# Patient Record
Sex: FEMALE | Race: NATIVE HAWAIIAN OR OTHER PACIFIC ISLANDER | NOT HISPANIC OR LATINO | URBAN - METROPOLITAN AREA
[De-identification: names, ages, dates, MRNs, and addresses within clinical notes are randomized per-mention and may not be internally consistent; named-entity substitution may affect disease eponyms.]

---

## 2024-02-14 ENCOUNTER — INPATIENT (INPATIENT)
Facility: HOSPITAL | Age: 29
LOS: 1 days | Discharge: HOME | DRG: 603 | End: 2024-02-16
Attending: STUDENT IN AN ORGANIZED HEALTH CARE EDUCATION/TRAINING PROGRAM | Admitting: STUDENT IN AN ORGANIZED HEALTH CARE EDUCATION/TRAINING PROGRAM
Payer: COMMERCIAL

## 2024-02-14 VITALS
SYSTOLIC BLOOD PRESSURE: 116 MMHG | TEMPERATURE: 98 F | HEIGHT: 64 IN | WEIGHT: 130.07 LBS | RESPIRATION RATE: 16 BRPM | HEART RATE: 74 BPM | OXYGEN SATURATION: 98 % | DIASTOLIC BLOOD PRESSURE: 75 MMHG

## 2024-02-14 DIAGNOSIS — L03.90 CELLULITIS, UNSPECIFIED: ICD-10-CM

## 2024-02-14 DIAGNOSIS — R63.8 OTHER SYMPTOMS AND SIGNS CONCERNING FOOD AND FLUID INTAKE: ICD-10-CM

## 2024-02-14 LAB
ALBUMIN SERPL ELPH-MCNC: 4 G/DL — SIGNIFICANT CHANGE UP (ref 3.3–5)
ALP SERPL-CCNC: 42 U/L — SIGNIFICANT CHANGE UP (ref 40–120)
ALT FLD-CCNC: 12 U/L — SIGNIFICANT CHANGE UP (ref 10–45)
ANION GAP SERPL CALC-SCNC: 9 MMOL/L — SIGNIFICANT CHANGE UP (ref 5–17)
APPEARANCE UR: CLEAR — SIGNIFICANT CHANGE UP
APTT BLD: 28.7 SEC — SIGNIFICANT CHANGE UP (ref 24.5–35.6)
AST SERPL-CCNC: 16 U/L — SIGNIFICANT CHANGE UP (ref 10–40)
BACTERIA # UR AUTO: ABNORMAL /HPF
BASOPHILS # BLD AUTO: 0.06 K/UL — SIGNIFICANT CHANGE UP (ref 0–0.2)
BASOPHILS NFR BLD AUTO: 0.7 % — SIGNIFICANT CHANGE UP (ref 0–2)
BILIRUB SERPL-MCNC: 0.4 MG/DL — SIGNIFICANT CHANGE UP (ref 0.2–1.2)
BILIRUB UR-MCNC: NEGATIVE — SIGNIFICANT CHANGE UP
BUN SERPL-MCNC: 12 MG/DL — SIGNIFICANT CHANGE UP (ref 7–23)
CALCIUM SERPL-MCNC: 9.1 MG/DL — SIGNIFICANT CHANGE UP (ref 8.4–10.5)
CHLORIDE SERPL-SCNC: 101 MMOL/L — SIGNIFICANT CHANGE UP (ref 96–108)
CK MB CFR SERPL CALC: 1.9 NG/ML — SIGNIFICANT CHANGE UP (ref 0–6.7)
CK SERPL-CCNC: 98 U/L — SIGNIFICANT CHANGE UP (ref 25–170)
CO2 SERPL-SCNC: 24 MMOL/L — SIGNIFICANT CHANGE UP (ref 22–31)
COLOR SPEC: YELLOW — SIGNIFICANT CHANGE UP
CREAT SERPL-MCNC: 0.99 MG/DL — SIGNIFICANT CHANGE UP (ref 0.5–1.3)
DIFF PNL FLD: NEGATIVE — SIGNIFICANT CHANGE UP
EGFR: 80 ML/MIN/1.73M2 — SIGNIFICANT CHANGE UP
EOSINOPHIL # BLD AUTO: 0.47 K/UL — SIGNIFICANT CHANGE UP (ref 0–0.5)
EOSINOPHIL NFR BLD AUTO: 5.3 % — SIGNIFICANT CHANGE UP (ref 0–6)
GLUCOSE SERPL-MCNC: 111 MG/DL — HIGH (ref 70–99)
GLUCOSE UR QL: NEGATIVE MG/DL — SIGNIFICANT CHANGE UP
HCG SERPL-ACNC: <0 MIU/ML — SIGNIFICANT CHANGE UP
HCT VFR BLD CALC: 45.8 % — HIGH (ref 34.5–45)
HGB BLD-MCNC: 15.6 G/DL — HIGH (ref 11.5–15.5)
IMM GRANULOCYTES NFR BLD AUTO: 0.1 % — SIGNIFICANT CHANGE UP (ref 0–0.9)
INR BLD: 1 — SIGNIFICANT CHANGE UP (ref 0.85–1.18)
KETONES UR-MCNC: NEGATIVE MG/DL — SIGNIFICANT CHANGE UP
LACTATE SERPL-SCNC: 0.8 MMOL/L — SIGNIFICANT CHANGE UP (ref 0.5–2)
LEUKOCYTE ESTERASE UR-ACNC: ABNORMAL
LYMPHOCYTES # BLD AUTO: 2.01 K/UL — SIGNIFICANT CHANGE UP (ref 1–3.3)
LYMPHOCYTES # BLD AUTO: 22.5 % — SIGNIFICANT CHANGE UP (ref 13–44)
MCHC RBC-ENTMCNC: 31.8 PG — SIGNIFICANT CHANGE UP (ref 27–34)
MCHC RBC-ENTMCNC: 34.1 GM/DL — SIGNIFICANT CHANGE UP (ref 32–36)
MCV RBC AUTO: 93.3 FL — SIGNIFICANT CHANGE UP (ref 80–100)
MONOCYTES # BLD AUTO: 0.77 K/UL — SIGNIFICANT CHANGE UP (ref 0–0.9)
MONOCYTES NFR BLD AUTO: 8.6 % — SIGNIFICANT CHANGE UP (ref 2–14)
NEUTROPHILS # BLD AUTO: 5.63 K/UL — SIGNIFICANT CHANGE UP (ref 1.8–7.4)
NEUTROPHILS NFR BLD AUTO: 62.8 % — SIGNIFICANT CHANGE UP (ref 43–77)
NITRITE UR-MCNC: NEGATIVE — SIGNIFICANT CHANGE UP
NRBC # BLD: 0 /100 WBCS — SIGNIFICANT CHANGE UP (ref 0–0)
PH UR: 6.5 — SIGNIFICANT CHANGE UP (ref 5–8)
PLATELET # BLD AUTO: 251 K/UL — SIGNIFICANT CHANGE UP (ref 150–400)
POTASSIUM SERPL-MCNC: 4.4 MMOL/L — SIGNIFICANT CHANGE UP (ref 3.5–5.3)
POTASSIUM SERPL-SCNC: 4.4 MMOL/L — SIGNIFICANT CHANGE UP (ref 3.5–5.3)
PROT SERPL-MCNC: 6.2 G/DL — SIGNIFICANT CHANGE UP (ref 6–8.3)
PROT UR-MCNC: NEGATIVE MG/DL — SIGNIFICANT CHANGE UP
PROTHROM AB SERPL-ACNC: 11.4 SEC — SIGNIFICANT CHANGE UP (ref 9.5–13)
RBC # BLD: 4.91 M/UL — SIGNIFICANT CHANGE UP (ref 3.8–5.2)
RBC # FLD: 12.5 % — SIGNIFICANT CHANGE UP (ref 10.3–14.5)
RBC CASTS # UR COMP ASSIST: 2 /HPF — SIGNIFICANT CHANGE UP (ref 0–4)
SODIUM SERPL-SCNC: 134 MMOL/L — LOW (ref 135–145)
SP GR SPEC: 1.02 — SIGNIFICANT CHANGE UP (ref 1–1.03)
SQUAMOUS # UR AUTO: 1 /HPF — SIGNIFICANT CHANGE UP (ref 0–5)
UROBILINOGEN FLD QL: 1 MG/DL — SIGNIFICANT CHANGE UP (ref 0.2–1)
WBC # BLD: 8.95 K/UL — SIGNIFICANT CHANGE UP (ref 3.8–10.5)
WBC # FLD AUTO: 8.95 K/UL — SIGNIFICANT CHANGE UP (ref 3.8–10.5)
WBC UR QL: 1 /HPF — SIGNIFICANT CHANGE UP (ref 0–5)

## 2024-02-14 PROCEDURE — 99223 1ST HOSP IP/OBS HIGH 75: CPT | Mod: GC

## 2024-02-14 PROCEDURE — 99285 EMERGENCY DEPT VISIT HI MDM: CPT

## 2024-02-14 RX ORDER — PIPERACILLIN AND TAZOBACTAM 4; .5 G/20ML; G/20ML
3.38 INJECTION, POWDER, LYOPHILIZED, FOR SOLUTION INTRAVENOUS ONCE
Refills: 0 | Status: COMPLETED | OUTPATIENT
Start: 2024-02-14 | End: 2024-02-14

## 2024-02-14 RX ORDER — VANCOMYCIN HCL 1 G
1000 VIAL (EA) INTRAVENOUS ONCE
Refills: 0 | Status: COMPLETED | OUTPATIENT
Start: 2024-02-14 | End: 2024-02-14

## 2024-02-14 RX ORDER — SODIUM CHLORIDE 9 MG/ML
1000 INJECTION INTRAMUSCULAR; INTRAVENOUS; SUBCUTANEOUS ONCE
Refills: 0 | Status: COMPLETED | OUTPATIENT
Start: 2024-02-14 | End: 2024-02-14

## 2024-02-14 RX ADMIN — Medication 250 MILLIGRAM(S): at 21:48

## 2024-02-14 RX ADMIN — PIPERACILLIN AND TAZOBACTAM 200 GRAM(S): 4; .5 INJECTION, POWDER, LYOPHILIZED, FOR SOLUTION INTRAVENOUS at 21:48

## 2024-02-14 RX ADMIN — SODIUM CHLORIDE 1000 MILLILITER(S): 9 INJECTION INTRAMUSCULAR; INTRAVENOUS; SUBCUTANEOUS at 21:48

## 2024-02-14 NOTE — ED PROVIDER NOTE - CLINICAL SUMMARY MEDICAL DECISION MAKING FREE TEXT BOX
28F PMH SIBO, gluten intolerance, p/w redness/swelling. 8d ago while in [place], after a yoga class, pt began feeling itching to LLQ/L back and L thigh - she noticed 3 small red/bumpy areas at those regions. Began applying hydrocortisone cream but symptoms were worsening. Had a virtual visit w/ her PMD and was prescribed ketoconazole. Began having oozing from the skin and symptoms continued to worsen and went to  when she came to [place] and was prescribed mupirocin and bactrim 2d ago. Symptoms (of redness/swelling and mild discomfort) continue to worsen so came to ED. No other systemic symptoms.   Vitals wnl, exam as above. Very well appearing.   ddx: What may have started out as a contact dermatitis or mild fungal infection is now almost certainly a superimposed bacterial cellulitis. Clinically not nec fasc.   Labs, empiric abx, reassess.

## 2024-02-14 NOTE — H&P ADULT - PROBLEM SELECTOR PLAN 1
Symptoms began >1 week prior s/p yoga class. Was using ketoconazole lotion then transitioned to PO bactrim and mupirocin s/p UC evaluation i/s/o skin oozing. Physical exam showing     - c/w Cefazolin 1g IV q8hr   - continue to monitor progression of symptoms Symptoms began >1 week prior s/p yoga class. Was using ketoconazole lotion then transitioned to PO bactrim and mupirocin s/p UC evaluation i/s/o skin oozing. Physical exam showing extensive   - c/w Cefazolin 1g IV q8hr   - continue to monitor progression of symptoms  - f/u ESR/CRP in AM  - no imaging indicated at this time -- if cellulitis worsening, can consider broad spectrum with imaging. NO leukocytosis and no signs of sepsis

## 2024-02-14 NOTE — H&P ADULT - PROBLEM SELECTOR PLAN 2
F: 1L NS   E: replete as needed  D: Gluten free    Dispo: [facility]

## 2024-02-14 NOTE — ED ADULT NURSE NOTE - CHIEF COMPLAINT QUOTE
Pt presents with c/o dx "cellulitic infection", with onset to abdomen x one week ago, seen at [facility] MD, given antifungal cream, s/s worsened, spreading to back and left leg, seen at  x 2 days ago, started on oral ABX and topical ABX, wants to be re-evaluated.

## 2024-02-14 NOTE — ED ADULT NURSE NOTE - OBJECTIVE STATEMENT
Patient w/ maculo- papular and erythematous rashes and hives on abdomen, back, buttocks and bilateral thighs, painful and pruritic w/ some serous discharge, no fever, no chills.  Patient states this started 1 week ago after a yoga class while in [place], becoming worse.  No SOB , no mouth/throat swelling.  Went to an UC 2 days ago, Dx w/ possible Staph infection and cellulitis, prescribed Sulfa antibiotic and Muciprocin, states rash and symptoms becoming worse.

## 2024-02-14 NOTE — H&P ADULT - NSHPPHYSICALEXAM_GEN_ALL_CORE
Detail Level: Detailed Quality 110: Preventive Care And Screening: Influenza Immunization: Influenza Immunization previously received during influenza season Quality 130: Documentation Of Current Medications In The Medical Record: Current Medications Documented Quality 431: Preventive Care And Screening: Unhealthy Alcohol Use - Screening: Patient not identified as an unhealthy alcohol user when screened for unhealthy alcohol use using a systematic screening method Quality 226: Preventive Care And Screening: Tobacco Use: Screening And Cessation Intervention: Patient screened for tobacco use and is an ex/non-smoker VITAL SIGNS:  T(F): 97.9 (02-15-24 @ 02:10), Max: 98.5 (02-14-24 @ 20:42)  HR: 65 (02-15-24 @ 02:10) (65 - 74)  BP: 117/69 (02-15-24 @ 02:10) (110/71 - 117/69)  RR: 17 (02-15-24 @ 02:10) (16 - 17)  SpO2: 100% (02-15-24 @ 02:10) (98% - 100%)    PHYSICAL EXAM:    Constitutional: resting comfortably in bed; NAD  Head: NC/AT  Eyes: PERRL, EOMI, clear conjunctiva  ENT: no nasal discharge; MMM  Respiratory: CTA B/L; no W/R/R  Cardiac: +S1/S2; RRR; no M/R/G  Gastrointestinal: soft, NT/ND; no rebound or guarding; +BSx4  Extremities: WWP; RLE with edema and redness extending from left lower back to posterior knee with mild serosanguinous drainage noted   Musculoskeletal: NROM x4; no joint swelling, tenderness or erythema  Vascular: 2+ radial, femoral, DP/PT pulses B/L  Neurologic: AAOx3;no focal deficits  Psychiatric: affect and characteristics of appearance, verbalizations, behaviors are appropriate

## 2024-02-14 NOTE — H&P ADULT - ASSESSMENT
27 yo female with hx of SIBO presented to ED i/s/o worsening L back and thigh skin ulcerations despite being on PO abx. Admitted for management of cellulitis.

## 2024-02-14 NOTE — ED PROVIDER NOTE - PHYSICAL EXAMINATION
Extensive blanching erythema/warmth to LLQ/L flank and L anterior/lateral/posterior thigh. Erythema extends beyond demarcated line drawn by UC. There are scattered areas of whitich skin at the edges of the erythema. Also areas of mild skin thickness w/ scant yellowish discharge - minimally on LLQ but larger area on L thigh.   Soft compartments. FROM all joints.   no other LE edema. normal equal distal pulses, steady unassisted gait.  No crepitus, induration, fluctuance.

## 2024-02-14 NOTE — H&P ADULT - ATTENDING COMMENTS
#Cellultiis: p/w extensive LLE cellulitis extending from L lower back to L knee. +L inguinal lymphadenopathy. +warmth, edema, weeping on exam, no fluctance or crepitis. Compartments intact. 0/4 SIRS criteria. F/up ESR/CRP. c/w Cefazolin for non purulent cellulitis. Serial exams- Consider CT imaging if worsening.

## 2024-02-14 NOTE — ED ADULT NURSE NOTE - NSFALLUNIVINTERV_ED_ALL_ED
Bed/Stretcher in lowest position, wheels locked, appropriate side rails in place/Call bell, personal items and telephone in reach/Instruct patient to call for assistance before getting out of bed/chair/stretcher/Non-slip footwear applied when patient is off stretcher/[place] to call system/Physically safe environment - no spills, clutter or unnecessary equipment/Purposeful proactive rounding/Room/bathroom lighting operational, light cord in reach

## 2024-02-14 NOTE — ED ADULT NURSE REASSESSMENT NOTE - NS ED NURSE REASSESS COMMENT FT1
Pt handed off to [facility] nurse Martinez. Pt w/ DIRTY bed. Pt resting comfortably in chair, bedside report given.

## 2024-02-14 NOTE — ED ADULT TRIAGE NOTE - CHIEF COMPLAINT QUOTE
Pt presents with c/o dx "cellulitic infection", with onset to abdomen x one week ago, seen at [facility] MD, given antifungal cream, s/s worsened, spreading to back and left leg, seen at  x 2 days ago, started on oral ABX and topical ABX, wants to be re-evaluated.

## 2024-02-14 NOTE — ED PROVIDER NOTE - OBJECTIVE STATEMENT
28F PMH SIBO, gluten intolerance, p/w redness/swelling. 8d ago while in [place], after a yoga class, pt began feeling itching to LLQ/L back and L thigh - she noticed 3 small red/bumpy areas at those regions. Began applying hydrocortisone cream but symptoms were worsening. Had a virtual visit w/ her PMD and was prescribed ketoconazole. Began having oozing from the skin and symptoms continued to worsen and went to UC when she came to [place] and was prescribed mupirocin and bactrim 2d ago. Symptoms (of redness/swelling and mild discomfort) continue to worsen so came to ED. No other systemic symptoms.   Denies f/c, SOB/CP, URI symptoms, NVD, abd pain, urinary complaints, focal weakness/numbness, other known environmental exposures.

## 2024-02-14 NOTE — H&P ADULT - NSHPLABSRESULTS_GEN_ALL_CORE
.  LABS:                         15.6   8.95  )-----------( 251      ( 14 Feb 2024 21:55 )             45.8     02-14    134<L>  |  101  |  12  ----------------------------<  111<H>  4.4   |  24  |  0.99    Ca    9.1      14 Feb 2024 21:55    TPro  6.2  /  Alb  4.0  /  TBili  0.4  /  DBili  x   /  AST  16  /  ALT  12  /  AlkPhos  42  02-14    PT/INR - ( 14 Feb 2024 21:55 )   PT: 11.4 sec;   INR: 1.00          PTT - ( 14 Feb 2024 21:55 )  PTT:28.7 sec  Urinalysis Basic - ( 14 Feb 2024 21:55 )    Color: x / Appearance: x / SG: x / pH: x  Gluc: 111 mg/dL / Ketone: x  / Bili: x / Urobili: x   Blood: x / Protein: x / Nitrite: x   Leuk Esterase: x / RBC: x / WBC x   Sq Epi: x / Non Sq Epi: x / Bacteria: x        Lactate, Blood: 0.8 mmol/L (02-14 @ 21:55)      RADIOLOGY, EKG & ADDITIONAL TESTS: Reviewed.

## 2024-02-14 NOTE — H&P ADULT - HISTORY OF PRESENT ILLNESS
27 yo female with PMHx of SIBO, gluten intolerance presents to ED i/s/o redness and swelling to left thigh and back. Pt endorsing that her symptoms began >1 week prior in [place] after a yoga class and she was seen by PMD and has been using ketoconazole. Pt went to urgent care 2 days prior when she noticed oozing from the site and was prescribed PO Bactrim and Mupirocin. Pt presenting to ED today i/s/o worsening redness and swelling    Vitals: /75, HR 74, T 98.5 Oral, SpO2 98% on RA, RR 16  Labs: WBC 8.95, Hgb 15.6, Plt 251, Na 134, K 4.4, BUN 12, Cr 0.99,  HCG and Lactate negative; UA negative   Interventions: Vancomycin 1000mg IV x1, Zosyn 3.375mg IV x1, NS 1L x1  27 yo female with PMHx of SIBO, gluten intolerance presents to ED i/s/o redness and swelling to left thigh and back. Pt endorsing that her symptoms began >1 week prior in [place] after a yoga class and she was seen by PMD and has been using ketoconazole. Pt went to urgent care 2 days prior when she noticed oozing from the site and was prescribed PO Bactrim and Mupirocin. Pt presenting to ED today i/s/o worsening redness and swelling of her LLE. Pt endorsing that she otherwise feels fine and denies fever, chills, abdominal pain, nausea/vomiting.     ED Course:  Vitals: /75, HR 74, T 98.5 Oral, SpO2 98% on RA, RR 16  Labs: WBC 8.95, Hgb 15.6, Plt 251, Na 134, K 4.4, BUN 12, Cr 0.99,  HCG and Lactate negative; UA negative   Interventions: Vancomycin 1000mg IV x1, Zosyn 3.375mg IV x1, NS 1L x1

## 2024-02-14 NOTE — ED PROVIDER NOTE - PROGRESS NOTE DETAILS
[place]: labs grossly wnl. Pt remains well appearing. Given worsening symptoms, failing outpt abx, and exam findings, will admit pt for further care.

## 2024-02-15 LAB
ANION GAP SERPL CALC-SCNC: 5 MMOL/L — SIGNIFICANT CHANGE UP (ref 5–17)
BUN SERPL-MCNC: 12 MG/DL — SIGNIFICANT CHANGE UP (ref 7–23)
CALCIUM SERPL-MCNC: 8.1 MG/DL — LOW (ref 8.4–10.5)
CHLORIDE SERPL-SCNC: 112 MMOL/L — HIGH (ref 96–108)
CO2 SERPL-SCNC: 22 MMOL/L — SIGNIFICANT CHANGE UP (ref 22–31)
CREAT SERPL-MCNC: 1.02 MG/DL — SIGNIFICANT CHANGE UP (ref 0.5–1.3)
CRP SERPL-MCNC: <3 MG/L — SIGNIFICANT CHANGE UP (ref 0–4)
EGFR: 77 ML/MIN/1.73M2 — SIGNIFICANT CHANGE UP
ERYTHROCYTE [SEDIMENTATION RATE] IN BLOOD: 2 MM/HR — SIGNIFICANT CHANGE UP
GLUCOSE SERPL-MCNC: 88 MG/DL — SIGNIFICANT CHANGE UP (ref 70–99)
HCT VFR BLD CALC: 42.7 % — SIGNIFICANT CHANGE UP (ref 34.5–45)
HGB BLD-MCNC: 14.3 G/DL — SIGNIFICANT CHANGE UP (ref 11.5–15.5)
HIV 1+2 AB+HIV1 P24 AG SERPL QL IA: SIGNIFICANT CHANGE UP
MAGNESIUM SERPL-MCNC: 2 MG/DL — SIGNIFICANT CHANGE UP (ref 1.6–2.6)
MCHC RBC-ENTMCNC: 31.6 PG — SIGNIFICANT CHANGE UP (ref 27–34)
MCHC RBC-ENTMCNC: 33.5 GM/DL — SIGNIFICANT CHANGE UP (ref 32–36)
MCV RBC AUTO: 94.3 FL — SIGNIFICANT CHANGE UP (ref 80–100)
NRBC # BLD: 0 /100 WBCS — SIGNIFICANT CHANGE UP (ref 0–0)
PHOSPHATE SERPL-MCNC: 3.7 MG/DL — SIGNIFICANT CHANGE UP (ref 2.5–4.5)
PLATELET # BLD AUTO: 236 K/UL — SIGNIFICANT CHANGE UP (ref 150–400)
POTASSIUM SERPL-MCNC: 4.7 MMOL/L — SIGNIFICANT CHANGE UP (ref 3.5–5.3)
POTASSIUM SERPL-SCNC: 4.7 MMOL/L — SIGNIFICANT CHANGE UP (ref 3.5–5.3)
RBC # BLD: 4.53 M/UL — SIGNIFICANT CHANGE UP (ref 3.8–5.2)
RBC # FLD: 12.6 % — SIGNIFICANT CHANGE UP (ref 10.3–14.5)
SODIUM SERPL-SCNC: 139 MMOL/L — SIGNIFICANT CHANGE UP (ref 135–145)
WBC # BLD: 7.09 K/UL — SIGNIFICANT CHANGE UP (ref 3.8–10.5)
WBC # FLD AUTO: 7.09 K/UL — SIGNIFICANT CHANGE UP (ref 3.8–10.5)

## 2024-02-15 PROCEDURE — 99233 SBSQ HOSP IP/OBS HIGH 50: CPT | Mod: GC

## 2024-02-15 PROCEDURE — 72193 CT PELVIS W/DYE: CPT | Mod: 26

## 2024-02-15 PROCEDURE — 73701 CT LOWER EXTREMITY W/DYE: CPT | Mod: 26,LT,76

## 2024-02-15 RX ORDER — DIPHENHYDRAMINE HCL 50 MG
25 CAPSULE ORAL EVERY 4 HOURS
Refills: 0 | Status: DISCONTINUED | OUTPATIENT
Start: 2024-02-15 | End: 2024-02-16

## 2024-02-15 RX ORDER — VANCOMYCIN HCL 1 G
1000 VIAL (EA) INTRAVENOUS EVERY 12 HOURS
Refills: 0 | Status: DISCONTINUED | OUTPATIENT
Start: 2024-02-16 | End: 2024-02-16

## 2024-02-15 RX ORDER — HYDROCORTISONE 1 %
1 OINTMENT (GRAM) TOPICAL EVERY 12 HOURS
Refills: 0 | Status: DISCONTINUED | OUTPATIENT
Start: 2024-02-15 | End: 2024-02-16

## 2024-02-15 RX ORDER — VANCOMYCIN HCL 1 G
1000 VIAL (EA) INTRAVENOUS EVERY 12 HOURS
Refills: 0 | Status: DISCONTINUED | OUTPATIENT
Start: 2024-02-15 | End: 2024-02-15

## 2024-02-15 RX ORDER — VANCOMYCIN HCL 1 G
1000 VIAL (EA) INTRAVENOUS ONCE
Refills: 0 | Status: COMPLETED | OUTPATIENT
Start: 2024-02-15 | End: 2024-02-15

## 2024-02-15 RX ORDER — CEFAZOLIN SODIUM 1 G
1000 VIAL (EA) INJECTION EVERY 8 HOURS
Refills: 0 | Status: DISCONTINUED | OUTPATIENT
Start: 2024-02-15 | End: 2024-02-15

## 2024-02-15 RX ORDER — PIPERACILLIN AND TAZOBACTAM 4; .5 G/20ML; G/20ML
3.38 INJECTION, POWDER, LYOPHILIZED, FOR SOLUTION INTRAVENOUS EVERY 8 HOURS
Refills: 0 | Status: DISCONTINUED | OUTPATIENT
Start: 2024-02-15 | End: 2024-02-15

## 2024-02-15 RX ORDER — ACETAMINOPHEN 500 MG
650 TABLET ORAL EVERY 6 HOURS
Refills: 0 | Status: DISCONTINUED | OUTPATIENT
Start: 2024-02-15 | End: 2024-02-16

## 2024-02-15 RX ORDER — VANCOMYCIN HCL 1 G
VIAL (EA) INTRAVENOUS
Refills: 0 | Status: DISCONTINUED | OUTPATIENT
Start: 2024-02-15 | End: 2024-02-16

## 2024-02-15 RX ADMIN — Medication 250 MILLIGRAM(S): at 15:00

## 2024-02-15 RX ADMIN — Medication 25 MILLIGRAM(S): at 04:01

## 2024-02-15 RX ADMIN — Medication 100 MILLIGRAM(S): at 04:01

## 2024-02-15 RX ADMIN — Medication 1 APPLICATION(S): at 12:52

## 2024-02-15 RX ADMIN — Medication 100 MILLIGRAM(S): at 11:34

## 2024-02-15 RX ADMIN — Medication 25 MILLIGRAM(S): at 16:30

## 2024-02-15 NOTE — PROGRESS NOTE ADULT - SUBJECTIVE AND OBJECTIVE BOX
OVERNIGHT EVENTS:    SUBJECTIVE / INTERVAL HPI: Patient seen and examined at bedside.     VITAL SIGNS:  Vital Signs Last 24 Hrs  T(C): 36.9 (15 Feb 2024 05:26), Max: 36.9 (14 Feb 2024 20:42)  T(F): 98.5 (15 Feb 2024 05:26), Max: 98.5 (14 Feb 2024 20:42)  HR: 64 (15 Feb 2024 05:26) (64 - 74)  BP: 105/60 (15 Feb 2024 05:26) (105/60 - 117/69)  BP(mean): --  RR: 16 (15 Feb 2024 05:26) (16 - 17)  SpO2: 97% (15 Feb 2024 05:26) (97% - 100%)    Parameters below as of 15 Feb 2024 05:26  Patient On (Oxygen Delivery Method): room air        PHYSICAL EXAM:    General: alert, in no acute distress  HEENT: NC/AT; PERRL, anicteric sclera; MMM  Neck: supple  Cardiovascular: +S1/S2, RRR  Respiratory: CTA B/L; no W/R/R  Gastrointestinal: soft, NT/ND; +BSx4  Extremities: WWP; no edema, clubbing or cyanosis  Vascular: 2+ radial, DP/PT pulses B/L  Neurological: AAOx3; no focal deficits    MEDICATIONS:  MEDICATIONS  (STANDING):  ceFAZolin   IVPB 1000 milliGRAM(s) IV Intermittent every 8 hours    MEDICATIONS  (PRN):  diphenhydrAMINE 25 milliGRAM(s) Oral every 4 hours PRN Rash and/or Itching      ALLERGIES:  Allergies    No Known Allergies    Intolerances        LABS:                        14.3   7.09  )-----------( 236      ( 15 Feb 2024 05:30 )             42.7     02-15    139  |  112<H>  |  12  ----------------------------<  88  4.7   |  22  |  1.02    Ca    8.1<L>      15 Feb 2024 05:30  Phos  3.7     02-15  Mg     2.0     02-15    TPro  6.2  /  Alb  4.0  /  TBili  0.4  /  DBili  x   /  AST  16  /  ALT  12  /  AlkPhos  42  02-14    PT/INR - ( 14 Feb 2024 21:55 )   PT: 11.4 sec;   INR: 1.00          PTT - ( 14 Feb 2024 21:55 )  PTT:28.7 sec  Urinalysis Basic - ( 15 Feb 2024 05:30 )    Color: x / Appearance: x / SG: x / pH: x  Gluc: 88 mg/dL / Ketone: x  / Bili: x / Urobili: x   Blood: x / Protein: x / Nitrite: x   Leuk Esterase: x / RBC: x / WBC x   Sq Epi: x / Non Sq Epi: x / Bacteria: x      CAPILLARY BLOOD GLUCOSE          RADIOLOGY & ADDITIONAL TESTS: Reviewed. OVERNIGHT EVENTS:    SUBJECTIVE / INTERVAL HPI: Patient seen and examined at bedside. No events overnight, pt in no acute distress. Complaining of oozing from rash over left gluteus and leg, reports rash as itchy and warm with associated swelling. Denies HA, SOB, chest pain, joint pain, n/v, subjective fevers or chills.    VITAL SIGNS:  Vital Signs Last 24 Hrs  T(C): 36.9 (15 Feb 2024 05:26), Max: 36.9 (14 Feb 2024 20:42)  T(F): 98.5 (15 Feb 2024 05:26), Max: 98.5 (14 Feb 2024 20:42)  HR: 64 (15 Feb 2024 05:26) (64 - 74)  BP: 105/60 (15 Feb 2024 05:26) (105/60 - 117/69)  BP(mean): --  RR: 16 (15 Feb 2024 05:26) (16 - 17)  SpO2: 97% (15 Feb 2024 05:26) (97% - 100%)    Parameters below as of 15 Feb 2024 05:26  Patient On (Oxygen Delivery Method): room air      PHYSICAL EXAM:    General: alert, in no acute distress  HEENT: NC/AT; PERRL, anicteric sclera; MMM  Neck: supple  Cardiovascular: +S1/S2, RRR  Respiratory: CTA B/L; no W/R/R  Gastrointestinal: soft, NT/ND  Extremities: WWP; no edema, clubbing or cyanosis  Skin: Resolving rash over left lower abdomen, and left lower back. Large erythematous rash over left glute almost down to posterolateral knee with erythema extending past outline from UC drawn 2/13 with pulsatile margins, mild serosanguinous drainage. Mild ecchymosis over left lateral knee.  Lymph: + left inguinal lymphadenopathy  Vascular: 2+ radial, DP/PT pulses B/L, no calf tenderness, no pain with ankle flexion and extension.  Neurological: AAOx3; no focal deficits    MEDICATIONS:  MEDICATIONS  (STANDING):  ceFAZolin   IVPB 1000 milliGRAM(s) IV Intermittent every 8 hours    MEDICATIONS  (PRN):  diphenhydrAMINE 25 milliGRAM(s) Oral every 4 hours PRN Rash and/or Itching      ALLERGIES:  Allergies    No Known Allergies    Intolerances        LABS:                        14.3   7.09  )-----------( 236      ( 15 Feb 2024 05:30 )             42.7     02-15    139  |  112<H>  |  12  ----------------------------<  88  4.7   |  22  |  1.02    Ca    8.1<L>      15 Feb 2024 05:30  Phos  3.7     02-15  Mg     2.0     02-15    TPro  6.2  /  Alb  4.0  /  TBili  0.4  /  DBili  x   /  AST  16  /  ALT  12  /  AlkPhos  42  02-14    PT/INR - ( 14 Feb 2024 21:55 )   PT: 11.4 sec;   INR: 1.00          PTT - ( 14 Feb 2024 21:55 )  PTT:28.7 sec  Urinalysis Basic - ( 15 Feb 2024 05:30 )    Color: x / Appearance: x / SG: x / pH: x  Gluc: 88 mg/dL / Ketone: x  / Bili: x / Urobili: x   Blood: x / Protein: x / Nitrite: x   Leuk Esterase: x / RBC: x / WBC x   Sq Epi: x / Non Sq Epi: x / Bacteria: x      CAPILLARY BLOOD GLUCOSE          RADIOLOGY & ADDITIONAL TESTS: Reviewed.

## 2024-02-15 NOTE — SBIRT NOTE ADULT - NSSBIRTALCPOSREINDET_GEN_A_CORE
Patient reports that she is a "social drinker" and only drinks when out with friends for special occasions. Patient reports no difficulty stopping drinking.  provided positive reinforcement.

## 2024-02-15 NOTE — PROGRESS NOTE ADULT - PROBLEM SELECTOR PLAN 1
Symptoms began >1 week prior s/p yoga class. Was using ketoconazole lotion then transitioned to PO bactrim and mupirocin s/p UC evaluation i/s/o skin oozing. Physical exam showing extensive erythema on left lateral glute and leg with associated itch. ESR 2, CRP < 3.  - c/w Cefazolin 1g IV q8hr   - continue to monitor progression of symptoms  - CT Pelvis/LE w/ IV contrast r/o abscess  - Consider derm consult Symptoms began >1 week prior s/p yoga class. Was using ketoconazole lotion then transitioned to PO bactrim and mupirocin s/p UC evaluation i/s/o skin oozing. Physical exam showing extensive erythema on left lateral glute and leg with associated itch. ESR 2, CRP < 3.  - was started on Cefazolin 1g IV q8hr -- will escalate it to iv vanc   - continue to monitor progression of symptoms  - CT Pelvis/LE w/ IV contrast r/o abscess  - derm consulted

## 2024-02-15 NOTE — CHART NOTE - NSCHARTNOTEFT_GEN_A_CORE
Dermatology Chart Note    History obtained from primary team  Consulted for rash left thigh/buttock, present for >1 week, itchy, not painful. Progressed despite two days of Bactrim and mupirocin, pt now on Vanc. Pt denies joint pains, fevers. Started after hot yoga class. Pt also an avid cyclist. Vitals stable. No leukocytosis.     PHYSICAL EXAM (based off of photos provided by primary team):  Skin exam notable for:  Pink edematous confluent patch with areas of white scaling and overlying papulo-vesicles    ASSESSMENT/PLAN:  Favor exuberant contact or irritant dermatitis given clinical appearance, itch as predominant symptom, and stable vitals/ no leukocytosis  -Can continue abx per primary team for now  -Start triamcinolone 0.1% oint to AA bid  -Will assess response to topical steroid above tomorrow     Discussed with primary team.    Enrique Wasserman MD

## 2024-02-16 ENCOUNTER — TRANSCRIPTION ENCOUNTER (OUTPATIENT)
Age: 29
End: 2024-02-16

## 2024-02-16 VITALS
OXYGEN SATURATION: 99 % | TEMPERATURE: 99 F | DIASTOLIC BLOOD PRESSURE: 72 MMHG | SYSTOLIC BLOOD PRESSURE: 102 MMHG | HEART RATE: 82 BPM | RESPIRATION RATE: 18 BRPM

## 2024-02-16 DIAGNOSIS — K59.00 CONSTIPATION, UNSPECIFIED: ICD-10-CM

## 2024-02-16 PROBLEM — Z00.00 ENCOUNTER FOR PREVENTIVE HEALTH EXAMINATION: Status: ACTIVE | Noted: 2024-02-16

## 2024-02-16 PROBLEM — K63.8219 SMALL INTESTINAL BACTERIAL OVERGROWTH, UNSPECIFIED: Chronic | Status: ACTIVE | Noted: 2024-02-14

## 2024-02-16 PROBLEM — K90.41 NON-CELIAC GLUTEN SENSITIVITY: Chronic | Status: ACTIVE | Noted: 2024-02-14

## 2024-02-16 LAB
ALBUMIN SERPL ELPH-MCNC: 3.3 G/DL — SIGNIFICANT CHANGE UP (ref 3.3–5)
ALP SERPL-CCNC: 33 U/L — LOW (ref 40–120)
ALT FLD-CCNC: 7 U/L — LOW (ref 10–45)
ANION GAP SERPL CALC-SCNC: 11 MMOL/L — SIGNIFICANT CHANGE UP (ref 5–17)
AST SERPL-CCNC: 12 U/L — SIGNIFICANT CHANGE UP (ref 10–40)
BASOPHILS # BLD AUTO: 0.06 K/UL — SIGNIFICANT CHANGE UP (ref 0–0.2)
BASOPHILS NFR BLD AUTO: 0.7 % — SIGNIFICANT CHANGE UP (ref 0–2)
BILIRUB SERPL-MCNC: 0.4 MG/DL — SIGNIFICANT CHANGE UP (ref 0.2–1.2)
BUN SERPL-MCNC: 11 MG/DL — SIGNIFICANT CHANGE UP (ref 7–23)
CALCIUM SERPL-MCNC: 8.3 MG/DL — LOW (ref 8.4–10.5)
CHLORIDE SERPL-SCNC: 104 MMOL/L — SIGNIFICANT CHANGE UP (ref 96–108)
CO2 SERPL-SCNC: 26 MMOL/L — SIGNIFICANT CHANGE UP (ref 22–31)
CREAT SERPL-MCNC: 0.93 MG/DL — SIGNIFICANT CHANGE UP (ref 0.5–1.3)
CULTURE RESULTS: NO GROWTH — SIGNIFICANT CHANGE UP
EGFR: 86 ML/MIN/1.73M2 — SIGNIFICANT CHANGE UP
EOSINOPHIL # BLD AUTO: 0.62 K/UL — HIGH (ref 0–0.5)
EOSINOPHIL NFR BLD AUTO: 7.2 % — HIGH (ref 0–6)
ERYTHROCYTE [SEDIMENTATION RATE] IN BLOOD: 2 MM/HR — SIGNIFICANT CHANGE UP
GLUCOSE SERPL-MCNC: 88 MG/DL — SIGNIFICANT CHANGE UP (ref 70–99)
HAV IGM SER-ACNC: SIGNIFICANT CHANGE UP
HBV CORE AB SER-ACNC: SIGNIFICANT CHANGE UP
HBV CORE IGM SER-ACNC: SIGNIFICANT CHANGE UP
HBV SURFACE AG SER-ACNC: SIGNIFICANT CHANGE UP
HCT VFR BLD CALC: 46.4 % — HIGH (ref 34.5–45)
HCV AB S/CO SERPL IA: 0.03 S/CO — SIGNIFICANT CHANGE UP
HCV AB SERPL-IMP: SIGNIFICANT CHANGE UP
HGB BLD-MCNC: 15.6 G/DL — HIGH (ref 11.5–15.5)
IMM GRANULOCYTES NFR BLD AUTO: 0.3 % — SIGNIFICANT CHANGE UP (ref 0–0.9)
LYMPHOCYTES # BLD AUTO: 1.42 K/UL — SIGNIFICANT CHANGE UP (ref 1–3.3)
LYMPHOCYTES # BLD AUTO: 16.5 % — SIGNIFICANT CHANGE UP (ref 13–44)
MAGNESIUM SERPL-MCNC: 2 MG/DL — SIGNIFICANT CHANGE UP (ref 1.6–2.6)
MCHC RBC-ENTMCNC: 32.3 PG — SIGNIFICANT CHANGE UP (ref 27–34)
MCHC RBC-ENTMCNC: 33.6 GM/DL — SIGNIFICANT CHANGE UP (ref 32–36)
MCV RBC AUTO: 96.1 FL — SIGNIFICANT CHANGE UP (ref 80–100)
MONOCYTES # BLD AUTO: 0.84 K/UL — SIGNIFICANT CHANGE UP (ref 0–0.9)
MONOCYTES NFR BLD AUTO: 9.8 % — SIGNIFICANT CHANGE UP (ref 2–14)
NEUTROPHILS # BLD AUTO: 5.62 K/UL — SIGNIFICANT CHANGE UP (ref 1.8–7.4)
NEUTROPHILS NFR BLD AUTO: 65.5 % — SIGNIFICANT CHANGE UP (ref 43–77)
NRBC # BLD: 0 /100 WBCS — SIGNIFICANT CHANGE UP (ref 0–0)
PHOSPHATE SERPL-MCNC: 3 MG/DL — SIGNIFICANT CHANGE UP (ref 2.5–4.5)
PLATELET # BLD AUTO: 235 K/UL — SIGNIFICANT CHANGE UP (ref 150–400)
POTASSIUM SERPL-MCNC: 3.8 MMOL/L — SIGNIFICANT CHANGE UP (ref 3.5–5.3)
POTASSIUM SERPL-SCNC: 3.8 MMOL/L — SIGNIFICANT CHANGE UP (ref 3.5–5.3)
PROT SERPL-MCNC: 5.6 G/DL — LOW (ref 6–8.3)
RBC # BLD: 4.83 M/UL — SIGNIFICANT CHANGE UP (ref 3.8–5.2)
RBC # FLD: 12.4 % — SIGNIFICANT CHANGE UP (ref 10.3–14.5)
SODIUM SERPL-SCNC: 141 MMOL/L — SIGNIFICANT CHANGE UP (ref 135–145)
SPECIMEN SOURCE: SIGNIFICANT CHANGE UP
WBC # BLD: 8.59 K/UL — SIGNIFICANT CHANGE UP (ref 3.8–10.5)
WBC # FLD AUTO: 8.59 K/UL — SIGNIFICANT CHANGE UP (ref 3.8–10.5)

## 2024-02-16 PROCEDURE — 72193 CT PELVIS W/DYE: CPT

## 2024-02-16 PROCEDURE — 73701 CT LOWER EXTREMITY W/DYE: CPT

## 2024-02-16 PROCEDURE — 87340 HEPATITIS B SURFACE AG IA: CPT

## 2024-02-16 PROCEDURE — 87389 HIV-1 AG W/HIV-1&-2 AB AG IA: CPT

## 2024-02-16 PROCEDURE — 80053 COMPREHEN METABOLIC PANEL: CPT

## 2024-02-16 PROCEDURE — 86140 C-REACTIVE PROTEIN: CPT

## 2024-02-16 PROCEDURE — 96374 THER/PROPH/DIAG INJ IV PUSH: CPT

## 2024-02-16 PROCEDURE — 99285 EMERGENCY DEPT VISIT HI MDM: CPT | Mod: 25

## 2024-02-16 PROCEDURE — 86709 HEPATITIS A IGM ANTIBODY: CPT

## 2024-02-16 PROCEDURE — 85652 RBC SED RATE AUTOMATED: CPT

## 2024-02-16 PROCEDURE — 85610 PROTHROMBIN TIME: CPT

## 2024-02-16 PROCEDURE — 86803 HEPATITIS C AB TEST: CPT

## 2024-02-16 PROCEDURE — 85025 COMPLETE CBC W/AUTO DIFF WBC: CPT

## 2024-02-16 PROCEDURE — 36415 COLL VENOUS BLD VENIPUNCTURE: CPT

## 2024-02-16 PROCEDURE — 86705 HEP B CORE ANTIBODY IGM: CPT

## 2024-02-16 PROCEDURE — 96375 TX/PRO/DX INJ NEW DRUG ADDON: CPT

## 2024-02-16 PROCEDURE — 86618 LYME DISEASE ANTIBODY: CPT

## 2024-02-16 PROCEDURE — 83735 ASSAY OF MAGNESIUM: CPT

## 2024-02-16 PROCEDURE — 82550 ASSAY OF CK (CPK): CPT

## 2024-02-16 PROCEDURE — 85730 THROMBOPLASTIN TIME PARTIAL: CPT

## 2024-02-16 PROCEDURE — 86666 EHRLICHIA ANTIBODY: CPT

## 2024-02-16 PROCEDURE — 99239 HOSP IP/OBS DSCHRG MGMT >30: CPT | Mod: GC

## 2024-02-16 PROCEDURE — 81001 URINALYSIS AUTO W/SCOPE: CPT

## 2024-02-16 PROCEDURE — 82553 CREATINE MB FRACTION: CPT

## 2024-02-16 PROCEDURE — 83605 ASSAY OF LACTIC ACID: CPT

## 2024-02-16 PROCEDURE — 87040 BLOOD CULTURE FOR BACTERIA: CPT

## 2024-02-16 PROCEDURE — 99222 1ST HOSP IP/OBS MODERATE 55: CPT

## 2024-02-16 PROCEDURE — 86780 TREPONEMA PALLIDUM: CPT

## 2024-02-16 PROCEDURE — 86704 HEP B CORE ANTIBODY TOTAL: CPT

## 2024-02-16 PROCEDURE — 84702 CHORIONIC GONADOTROPIN TEST: CPT

## 2024-02-16 PROCEDURE — 85027 COMPLETE CBC AUTOMATED: CPT

## 2024-02-16 PROCEDURE — 80048 BASIC METABOLIC PNL TOTAL CA: CPT

## 2024-02-16 PROCEDURE — 86753 PROTOZOA ANTIBODY NOS: CPT

## 2024-02-16 PROCEDURE — 84100 ASSAY OF PHOSPHORUS: CPT

## 2024-02-16 PROCEDURE — 87086 URINE CULTURE/COLONY COUNT: CPT

## 2024-02-16 RX ORDER — SENNA PLUS 8.6 MG/1
2 TABLET ORAL AT BEDTIME
Refills: 0 | Status: DISCONTINUED | OUTPATIENT
Start: 2024-02-16 | End: 2024-02-16

## 2024-02-16 RX ORDER — POTASSIUM CHLORIDE 20 MEQ
20 PACKET (EA) ORAL ONCE
Refills: 0 | Status: DISCONTINUED | OUTPATIENT
Start: 2024-02-16 | End: 2024-02-16

## 2024-02-16 RX ADMIN — Medication 25 MILLIGRAM(S): at 09:16

## 2024-02-16 RX ADMIN — Medication 250 MILLIGRAM(S): at 05:43

## 2024-02-16 RX ADMIN — Medication 100 MILLIGRAM(S): at 10:27

## 2024-02-16 RX ADMIN — Medication 1 TABLET(S): at 12:58

## 2024-02-16 RX ADMIN — Medication 1 APPLICATION(S): at 06:59

## 2024-02-16 NOTE — PROGRESS NOTE ADULT - SUBJECTIVE AND OBJECTIVE BOX
OVERNIGHT EVENTS:    SUBJECTIVE / INTERVAL HPI: Patient seen and examined at bedside.     VITAL SIGNS:  Vital Signs Last 24 Hrs  T(C): 36.4 (16 Feb 2024 05:45), Max: 37.4 (15 Feb 2024 12:12)  T(F): 97.6 (16 Feb 2024 05:45), Max: 99.4 (15 Feb 2024 12:12)  HR: 61 (16 Feb 2024 05:45) (61 - 85)  BP: 104/67 (16 Feb 2024 05:45) (103/60 - 119/77)  BP(mean): --  RR: 17 (16 Feb 2024 05:45) (16 - 17)  SpO2: 98% (16 Feb 2024 05:45) (98% - 99%)    Parameters below as of 16 Feb 2024 05:45  Patient On (Oxygen Delivery Method): room air        PHYSICAL EXAM:    General: alert, in no acute distress  HEENT: NC/AT; PERRL, anicteric sclera; MMM  Neck: supple  Cardiovascular: +S1/S2, RRR  Respiratory: CTA B/L; no W/R/R  Gastrointestinal: soft, NT/ND; +BSx4  Extremities: WWP; no edema, clubbing or cyanosis  Vascular: 2+ radial, DP/PT pulses B/L  Neurological: AAOx3; no focal deficits    MEDICATIONS:  MEDICATIONS  (STANDING):  hydrocortisone 1% Ointment 1 Application(s) Topical every 12 hours  vancomycin  IVPB      vancomycin  IVPB 1000 milliGRAM(s) IV Intermittent every 12 hours    MEDICATIONS  (PRN):  acetaminophen     Tablet .. 650 milliGRAM(s) Oral every 6 hours PRN Temp greater or equal to 38C (100.4F), Mild Pain (1 - 3)  diphenhydrAMINE 25 milliGRAM(s) Oral every 4 hours PRN Rash and/or Itching      ALLERGIES:  Allergies    No Known Allergies    Intolerances        LABS:                        14.3   7.09  )-----------( 236      ( 15 Feb 2024 05:30 )             42.7     02-15    139  |  112<H>  |  12  ----------------------------<  88  4.7   |  22  |  1.02    Ca    8.1<L>      15 Feb 2024 05:30  Phos  3.7     02-15  Mg     2.0     02-15    TPro  6.2  /  Alb  4.0  /  TBili  0.4  /  DBili  x   /  AST  16  /  ALT  12  /  AlkPhos  42  02-14    PT/INR - ( 14 Feb 2024 21:55 )   PT: 11.4 sec;   INR: 1.00          PTT - ( 14 Feb 2024 21:55 )  PTT:28.7 sec  Urinalysis Basic - ( 15 Feb 2024 05:30 )    Color: x / Appearance: x / SG: x / pH: x  Gluc: 88 mg/dL / Ketone: x  / Bili: x / Urobili: x   Blood: x / Protein: x / Nitrite: x   Leuk Esterase: x / RBC: x / WBC x   Sq Epi: x / Non Sq Epi: x / Bacteria: x      CAPILLARY BLOOD GLUCOSE          RADIOLOGY & ADDITIONAL TESTS: Reviewed. OVERNIGHT EVENTS:    SUBJECTIVE / INTERVAL HPI: Patient seen and examined at bedside. No acute events overnight, reporting constipation and no bowel movement since weekend, has history of constipation 2/2 SIBO, reports using senna with tea in the past. Reports limited appetite that she says happens when she takes antibiotics, drinking water and ambulating. Denies HA, SOB, abdominal pain, joint pain, chest pain.    VITAL SIGNS:  Vital Signs Last 24 Hrs  T(C): 36.4 (16 Feb 2024 05:45), Max: 37.4 (15 Feb 2024 12:12)  T(F): 97.6 (16 Feb 2024 05:45), Max: 99.4 (15 Feb 2024 12:12)  HR: 61 (16 Feb 2024 05:45) (61 - 85)  BP: 104/67 (16 Feb 2024 05:45) (103/60 - 119/77)  BP(mean): --  RR: 17 (16 Feb 2024 05:45) (16 - 17)  SpO2: 98% (16 Feb 2024 05:45) (98% - 99%)    Parameters below as of 16 Feb 2024 05:45  Patient On (Oxygen Delivery Method): room air        PHYSICAL EXAM:    General: alert, in no acute distress  HEENT: NC/AT; PERRL, anicteric sclera; MMM  Neck: supple  Cardiovascular: +S1/S2, RRR  Respiratory: CTA B/L; no W/R/R  Gastrointestinal: soft, NT/ND; +BSx4  Extremities: WWP; no edema, clubbing or cyanosis  Skin: Erythematous rash over left abdomen, glute, lateral thigh, spreading to medial thigh and pubic region progressed from yesterday. Vesicles noted over lateral thigh. Notable swell of affected regions, areas warm to touch.  Vascular: 2+ radial, DP/PT pulses B/L  Neurological: AAOx3; no focal deficits    MEDICATIONS:  MEDICATIONS  (STANDING):  hydrocortisone 1% Ointment 1 Application(s) Topical every 12 hours  vancomycin  IVPB      vancomycin  IVPB 1000 milliGRAM(s) IV Intermittent every 12 hours    MEDICATIONS  (PRN):  acetaminophen     Tablet .. 650 milliGRAM(s) Oral every 6 hours PRN Temp greater or equal to 38C (100.4F), Mild Pain (1 - 3)  diphenhydrAMINE 25 milliGRAM(s) Oral every 4 hours PRN Rash and/or Itching      ALLERGIES:  Allergies    No Known Allergies    Intolerances        LABS:                        14.3   7.09  )-----------( 236      ( 15 Feb 2024 05:30 )             42.7     02-15    139  |  112<H>  |  12  ----------------------------<  88  4.7   |  22  |  1.02    Ca    8.1<L>      15 Feb 2024 05:30  Phos  3.7     02-15  Mg     2.0     02-15    TPro  6.2  /  Alb  4.0  /  TBili  0.4  /  DBili  x   /  AST  16  /  ALT  12  /  AlkPhos  42  02-14    PT/INR - ( 14 Feb 2024 21:55 )   PT: 11.4 sec;   INR: 1.00          PTT - ( 14 Feb 2024 21:55 )  PTT:28.7 sec  Urinalysis Basic - ( 15 Feb 2024 05:30 )    Color: x / Appearance: x / SG: x / pH: x  Gluc: 88 mg/dL / Ketone: x  / Bili: x / Urobili: x   Blood: x / Protein: x / Nitrite: x   Leuk Esterase: x / RBC: x / WBC x   Sq Epi: x / Non Sq Epi: x / Bacteria: x      CAPILLARY BLOOD GLUCOSE          RADIOLOGY & ADDITIONAL TESTS: Reviewed.

## 2024-02-16 NOTE — DISCHARGE NOTE PROVIDER - NSDCMRMEDTOKEN_GEN_ALL_CORE_FT
amoxicillin-clavulanate 875 mg-125 mg oral tablet: 1 tab(s) orally 2 times a day  doxycycline monohydrate 50 mg oral capsule: 2 cap(s) orally every 12 hours  triamcinolone 0.1% topical ointment: Apply topically to affected area 2 times a day

## 2024-02-16 NOTE — CONSULT NOTE ADULT - SUBJECTIVE AND OBJECTIVE BOX
HPI:  29 yo female with PMHx of SIBO, gluten intolerance presents to ED i/s/o redness and swelling to left thigh and back. Pt endorsing that her symptoms began >1 week prior in [place] after a yoga class and she was seen by PMD and has been using ketoconazole. Pt went to urgent care 2 days prior when she noticed oozing from the site and was prescribed PO Bactrim and Mupirocin. Pt presenting to ED today i/s/o worsening redness and swelling of her LLE. Pt endorsing that she otherwise feels fine and denies fever, chills, abdominal pain, nausea/vomiting.     Dermatology consulted for rash above. Patient states rash started on abdomen/back after hot yoga class at [facility], progressed over the course of several days. Very itchy, occasionally tender because of the swelling. Since hospitalization, pt on Vanc. CT showing no abscess, read as consistent with cellulitis. Pt started applying hydrocortisone 1% to area yesterday and noticing a little improvement.     ED Course:  Vitals: /75, HR 74, T 98.5 Oral, SpO2 98% on RA, RR 16  Labs: WBC 8.95, Hgb 15.6, Plt 251, Na 134, K 4.4, BUN 12, Cr 0.99,  HCG and Lactate negative; UA negative   Interventions: Vancomycin 1000mg IV x1, Zosyn 3.375mg IV x1, NS 1L x1  (14 Feb 2024 23:25)      PAST MEDICAL & SURGICAL HISTORY:  Small intestinal bacterial overgrowth (SIBO)      Gluten intolerance          REVIEW OF SYSTEMS      General: no fevers/chills, no lethary	    Skin/Breast: see HPI  	  Ophthalmologic: no eye pain or change in vision  	  ENMT: no dysphagia or change in hearing    Respiratory and Thorax: no SOB or cough  	  Cardiovascular: no palpitations or chest pain    Gastrointestinal: no abdomenal pain or blood in stool     Genitourinary: no dysuria or frequency    Musculoskeletal: no joint pains or weakness	    Neurological:no weakness, numbness , or tingling    MEDICATIONS  (STANDING):  clobetasol 0.05% Ointment 1 Application(s) Topical two times a day  vancomycin  IVPB      vancomycin  IVPB 1000 milliGRAM(s) IV Intermittent every 12 hours    MEDICATIONS  (PRN):  acetaminophen     Tablet .. 650 milliGRAM(s) Oral every 6 hours PRN Temp greater or equal to 38C (100.4F), Mild Pain (1 - 3)  diphenhydrAMINE 25 milliGRAM(s) Oral every 4 hours PRN Rash and/or Itching      Allergies    No Known Allergies    Intolerances        SOCIAL HISTORY: Lives in [place], here for work        Vital Signs Last 24 Hrs  T(C): 36.4 (16 Feb 2024 05:45), Max: 37.4 (15 Feb 2024 12:12)  T(F): 97.6 (16 Feb 2024 05:45), Max: 99.4 (15 Feb 2024 12:12)  HR: 61 (16 Feb 2024 05:45) (61 - 85)  BP: 104/67 (16 Feb 2024 05:45) (103/60 - 119/77)  BP(mean): --  RR: 17 (16 Feb 2024 05:45) (16 - 17)  SpO2: 98% (16 Feb 2024 05:45) (98% - 99%)    Parameters below as of 16 Feb 2024 05:45  Patient On (Oxygen Delivery Method): room air        PHYSICAL EXAM:     The patient was alert and oriented X 3, well nourished, and in no  apparent distress.  OP showed no ulcerations  There was no visible lymphadenopathy.  Conjunctiva were non injected  There was no clubbing or edema of extremities.  The scalp, hair, face, eyebrows, lips, OP, neck, chest, back,   extremities X 4 were examined.  There was no hyperhidrosis or bromhidrosis.    Of note on skin exam:   Pink edematous non-contiguous patch over left flank, buttock, thigh, with areas of overlying scale and clustered papulovesicles      LABS:                        14.3   7.09  )-----------( 236      ( 15 Feb 2024 05:30 )             42.7     02-15    139  |  112<H>  |  12  ----------------------------<  88  4.7   |  22  |  1.02    Ca    8.1<L>      15 Feb 2024 05:30  Phos  3.7     02-15  Mg     2.0     02-15    TPro  6.2  /  Alb  4.0  /  TBili  0.4  /  DBili  x   /  AST  16  /  ALT  12  /  AlkPhos  42  02-14    PT/INR - ( 14 Feb 2024 21:55 )   PT: 11.4 sec;   INR: 1.00          PTT - ( 14 Feb 2024 21:55 )  PTT:28.7 sec  Urinalysis Basic - ( 15 Feb 2024 05:30 )    Color: x / Appearance: x / SG: x / pH: x  Gluc: 88 mg/dL / Ketone: x  / Bili: x / Urobili: x   Blood: x / Protein: x / Nitrite: x   Leuk Esterase: x / RBC: x / WBC x   Sq Epi: x / Non Sq Epi: x / Bacteria: x        RADIOLOGY & ADDITIONAL STUDIES:

## 2024-02-16 NOTE — PROGRESS NOTE ADULT - ASSESSMENT
29 yo woman visiting from [place] with PMHx of SIBO and gluten intolerance presenting with progressive redness and swelling of rash over left thing starting 1 week ago after hot yoga s/p ketoconazole treatment (2/9-2/11) and bactrim/mupirocin treatment (2/13-2/14). No systemic symptoms reported, no recent trauma reported, CBC/CMP wnl, ESR 2, CRP <3.
27 yo woman visiting from [place] with PMHx of SIBO and gluten intolerance presenting with progressive redness and swelling of rash over left thing starting 1 week ago after hot yoga s/p ketoconazole treatment (2/9-2/11) and bactrim/mupirocin treatment (2/13-2/14). No systemic symptoms reported, no recent trauma reported, CBC/CMP wnl, ESR 2, CRP <3.

## 2024-02-16 NOTE — DISCHARGE NOTE PROVIDER - HOSPITAL COURSE
#Discharge: do not delete    27 yo woman visiting from [place] with PMHx of SIBO and gluten intolerance presenting with progressive redness and swelling of rash over left thing starting 1 week ago after admitted fro suspected cellulitis vs erythema migrans.      #Cellulitis.   Symptoms began >1 week prior s/p yoga class. Was using ketoconazole lotion then transitioned to PO bactrim and mupirocin s/p UC evaluation i/s/o skin oozing. Physical exam showing extensive erythema on left lateral glut and leg with associated itch. ESR 2, CRP < 3. Reports hiking 2 weeks ago. CT AP showing inflammation with no abscess or gas.  - start doxycycline 100mg BID PO x7 days  - start augmentin x7 days  - continue with triamcinolone topical q12  - continue to monitor progression of symptoms  - f/u tick panel  - f/u with derm    #Constipation.   ·  Plan: Reports history of occasional constipation 2/2 SIBO, no BM since weekend. Uses senna with tea at home for constipation.  - Senna PRN.    Patient was discharged to: Home       New medications: Doxycycline, Augmentin, Triamcinolone topical  Changes to old medications: None   Medications that were stopped: None     Items to follow up as outpatient:  - f/u with PCP and Derm    Physical exam at the time of discharge:  AOx3, RRR, CTA BL, WWP, large rash along left buttocks

## 2024-02-16 NOTE — PROGRESS NOTE ADULT - ATTENDING COMMENTS
plan as above   LLE cellulitis vs abscess  s/p vanc and zosyn in ED   met 0/4 sirs   fu CT - escalate abx to vanc   hyponatremia on admission -- 134 - -now resolved after ivf   fu derm recs   dvt ppx low VTE
plan as above   LLE cellulitis vs dermatitis vs an irritant dermatitis   erythema migrans   s/p vanc and zosyn in ED   met 0/4 sirs   fu CT - cw cellulitis - no abscess seen   hyponatremia on admission -- 134 - -now resolved after ivf   will send tick borne disease panel   pt will fu with [facility] PCP on discharge   appreciate derm recs will dc with topical steroid oint -- will dc with doxy and augmentin for suspected erysipelas vs tick born disease x1 week   dvt ppx low VTE

## 2024-02-16 NOTE — PROGRESS NOTE ADULT - PROBLEM SELECTOR PLAN 2
Reports history of occasional constipation 2/2 SIBO, no BM since weekend. Uses senna with tea at home for constipation.  - Senna PRN
F: 1L NS   E: replete as needed  D: Gluten free    Dispo: [facility]

## 2024-02-16 NOTE — DISCHARGE NOTE NURSING/CASE MANAGEMENT/SOCIAL WORK - PATIENT PORTAL LINK FT
You can access the FollowMyHealth Patient Portal offered by [facility] by registering at the following website: [email or web address]. By joining [facility]’s FollowMyHealth portal, you will also be able to view your health information using other applications (apps) compatible with our system.

## 2024-02-16 NOTE — DISCHARGE NOTE PROVIDER - NSDCCPCAREPLAN_GEN_ALL_CORE_FT
PRINCIPAL DISCHARGE DIAGNOSIS  Diagnosis: Cellulitis  Assessment and Plan of Treatment: Cellulitis is an infection of the skin. This infection can cause redness, pain, and swelling. Cellulitis tends to affect deep layers of skin and sometimes the fat under the skin. This condition can happen when germs get into the skin. Normally, different types of germs live on a person's skin, and mostly these germs do not cause any problems. But if a person gets a cut or break in the skin, the germs can penetrate and cause an infection. Certain conditions can increase a person's chance of getting cellulitis. These include: having a cut (even a tiny one), having another type of skin infection or a long-term skin condition, swelling of the skin or swelling in the body, and being overweight. You were treated with IV antibiotics (vancomycin) and should continue taking DOXYCYCLINE AND AUGMENTIN and follow-up with your PCP and dermatologist. If you have anyworsening symptoms, fevers, or pain please seek medical attention.

## 2024-02-16 NOTE — DISCHARGE NOTE PROVIDER - PREFACE STATEMENT FOR MINUTES SPENT
I personally spent

## 2024-02-16 NOTE — DISCHARGE NOTE PROVIDER - CARE PROVIDER_API CALL
Enrique Wasserman  Dermatology  [address] [place] [id number]  Phone: [phone]  Fax: [phone]  Established Patient  Follow Up Time: 1-3 days

## 2024-02-16 NOTE — PROGRESS NOTE ADULT - PROBLEM SELECTOR PLAN 1
Symptoms began >1 week prior s/p yoga class. Was using ketoconazole lotion then transitioned to PO bactrim and mupirocin s/p UC evaluation i/s/o skin oozing. Physical exam showing extensive erythema on left lateral glute and leg with associated itch. ESR 2, CRP < 3. Reports hiking 2 weeks ago. CT AP showing inflammation with no abscess or gas.  - d/c vanc  - start doxycycline 100mg BID PO  - start clobetasol 0.05% BID while inpatient  - continue to monitor progression of symptoms  - Obtain tick labs  - Appreciate derm recs

## 2024-02-16 NOTE — DISCHARGE NOTE NURSING/CASE MANAGEMENT/SOCIAL WORK - NSDCPEFALRISK_GEN_ALL_CORE
Patient resting quietly in bed with eyes closed without distress, no apparent needs at this time.  Good chest rise and fall.  Call bell within reach     For information on Fall & Injury Prevention, visit: [email or web address] OR  [email or web address] OR  https://www.cdc.gov/steadi/patient.html

## 2024-02-16 NOTE — DISCHARGE NOTE PROVIDER - ATTENDING DISCHARGE PHYSICAL EXAMINATION:
LLE cellulitis vs dermatitis vs an irritant dermatitis   erythema migrans   s/p vanc and zosyn in ED   met 0/4 sirs   fu CT - cw cellulitis - no abscess seen   hyponatremia on admission -- 134 - -now resolved after ivf   will send tick borne disease panel   pt will fu with [facility] PCP on discharge   appreciate derm recs will dc with on triamcinolone 0.1% oint to AA bid as needed for up to 2 weeks, SED -- will dc with doxy and augmentin for suspected erysipelas vs tick born disease x1 week   dvt ppx low VTE    Physical exam:  GENERAL: NAD, lying in bed comfortably  HEAD:  Atraumatic, Normocephalic  EYES: EOMI, PERRLA, conjunctiva and sclera clear  ENT: Moist mucous membranes  NECK: Supple, No JVD  CHEST/LUNG: Clear to auscultation bilaterally; No rales, rhonchi, wheezing, or rubs.  HEART: Regular rate and rhythm; S1+ S2+   ABDOMEN: Bowel sounds present; Soft, Nontender, Nondistended   EXTREMITIES:  2+ Peripheral Pulses, brisk capillary refill. No clubbing, cyanosis, or edema  NERVOUS SYSTEM:  Alert & Oriented , speech clear   MSK: FROM all 4 extremities, full and equal strength  SKIN: erythematous/ edematous patch over left flank, buttock, thigh, with areas of overlying scale and clustered papulovesicles, groin with bullseye rash

## 2024-02-17 LAB — T PALLIDUM AB TITR SER: NEGATIVE — SIGNIFICANT CHANGE UP

## 2024-02-20 LAB
CULTURE RESULTS: SIGNIFICANT CHANGE UP
CULTURE RESULTS: SIGNIFICANT CHANGE UP
SPECIMEN SOURCE: SIGNIFICANT CHANGE UP
SPECIMEN SOURCE: SIGNIFICANT CHANGE UP

## 2024-02-22 ENCOUNTER — APPOINTMENT (OUTPATIENT)
Dept: DERMATOLOGY | Facility: CLINIC | Age: 29
End: 2024-02-22
Payer: COMMERCIAL

## 2024-02-22 VITALS — HEIGHT: 64 IN | WEIGHT: 130 LBS | BODY MASS INDEX: 22.2 KG/M2

## 2024-02-22 DIAGNOSIS — R59.0 LOCALIZED ENLARGED LYMPH NODES: ICD-10-CM

## 2024-02-22 DIAGNOSIS — E87.1 HYPO-OSMOLALITY AND HYPONATREMIA: ICD-10-CM

## 2024-02-22 DIAGNOSIS — K59.00 CONSTIPATION, UNSPECIFIED: ICD-10-CM

## 2024-02-22 DIAGNOSIS — L03.116 CELLULITIS OF LEFT LOWER LIMB: ICD-10-CM

## 2024-02-22 DIAGNOSIS — L23.9 ALLERGIC CONTACT DERMATITIS, UNSPECIFIED CAUSE: ICD-10-CM

## 2024-02-22 DIAGNOSIS — L03.312 CELLULITIS OF BACK [ANY PART EXCEPT BUTTOCK]: ICD-10-CM

## 2024-02-22 DIAGNOSIS — A46 ERYSIPELAS: ICD-10-CM

## 2024-02-22 DIAGNOSIS — L30.9 DERMATITIS, UNSPECIFIED: ICD-10-CM

## 2024-02-22 DIAGNOSIS — K90.41 NON-CELIAC GLUTEN SENSITIVITY: ICD-10-CM

## 2024-02-22 PROCEDURE — 99204 OFFICE O/P NEW MOD 45 MIN: CPT

## 2024-02-22 RX ORDER — CLOBETASOL PROPIONATE 0.5 MG/G
0.05 OINTMENT TOPICAL
Qty: 1 | Refills: 1 | Status: ACTIVE | COMMUNITY
Start: 2024-02-22 | End: 1900-01-01

## 2024-02-22 RX ORDER — AMOXICILLIN AND CLAVULANATE POTASSIUM 875; 125 MG/1; MG/1
875-125 TABLET, COATED ORAL
Refills: 0 | Status: ACTIVE | COMMUNITY

## 2024-02-22 RX ORDER — DOXYCYCLINE HYCLATE 50 MG/1
50 TABLET, FILM COATED ORAL
Refills: 0 | Status: ACTIVE | COMMUNITY

## 2024-02-22 RX ORDER — TRIAMCINOLONE ACETONIDE 1 MG/G
0.1 OINTMENT TOPICAL
Refills: 0 | Status: ACTIVE | COMMUNITY

## 2024-02-22 NOTE — PHYSICAL EXAM
[Alert] : alert [Oriented x 3] : ~L oriented x 3 [Well Nourished] : well nourished [Conjunctiva Non-injected] : conjunctiva non-injected [No Visual Lymphadenopathy] : no visual  lymphadenopathy [No Edema] : no edema [No Clubbing] : no clubbing [No Bromhidrosis] : no bromhidrosis [No Chromhidrosis] : no chromhidrosis [Face] : Face [Chest] : Chest [Abdomen] : Abdomen [Back] : Back [R Arm] : R Arm [R Leg] : R Leg [L Arm] : L Arm [L Leg] : L Leg [FreeTextEntry3] : left side, buttock, lateral /posterior thigh- large scaly dull erythematous thin plaques, with somewhat angulated borders

## 2024-02-22 NOTE — ASSESSMENT
[FreeTextEntry1] : #Acute eczematous eruption, suspect 2/2 acute contact dermatitis  Improving since discharge. Clinically, rash does not appear consistent with erythema migrans. Photos from her cellphone were also reviewed. Tick panel results are still pending.  Plan -gentle skin care advised. Discussed that contact dermatitis may take weeks to resolve completely, even with treatment. Exam today is reassuring and rash is much improved compared to photos from her cellphone.  -start clobetasol 0.05% cream BID for 2 weeks. Then use as needed for 2 weeks on and 2 weeks off. Can stop triamcinolone for now.  -moisturize frequently every day with aquaphor or vaseline for the next 2 weeks -OK to finish out 1 week course of PO antibiotics.  -follow up with her derm in [place] for monitoring and to discuss patch testing.   RTC PRN

## 2024-02-22 NOTE — HISTORY OF PRESENT ILLNESS
[de-identified] : AURELIA CHATTERJEE is a 27yo F hospital followed up for rash.   Was admitted 2/14-2/16/24 for "worsening cellulitis" of left thigh, buttock, side. She started developing an itchy plaque on her thigh about 1 week prior to admission, rapidly spread.  Tried ketoconazole lotion then transitioned to PO bactrim and mupirocin w/o improvement. Initially went to , had swab from skin which grew ?bacillus and?actinomyces (no report available). Derm was consulted in hospital due to concern for cellulitis and Dr. Wasserman saw the pt at bedside, suspected acute contact dermatitis.    Since discharge, rash has been improving but is still somewhat itchy and flaky. Currently on triamcinolone, amoxicillin, and doxycycline (inpatient team was concerned for Lyme or bacterial cellulitis).  Activities around rash onset include hiking 2 weeks prior,  doing yoga frequently, and physical therapy although her last session was weeks ago.   Going back to  tonight. Has local derm there who she plans to follow up with.   PMH: ANSELMO, gluten intolerance.  [FreeTextEntry1] : RPV - hospital Follow Up

## 2024-02-23 LAB
A PHAGOCYTOPH IGG TITR SER IF: SIGNIFICANT CHANGE UP
B BURGDOR AB SER QL IA: 0.29 IV — SIGNIFICANT CHANGE UP
B MICROTI IGG TITR SER: SIGNIFICANT CHANGE UP
E CHAFFEENSIS IGG TITR SER IF: SIGNIFICANT CHANGE UP

## 2024-03-21 ENCOUNTER — NON-APPOINTMENT (OUTPATIENT)
Age: 29
End: 2024-03-21